# Patient Record
Sex: FEMALE | Race: WHITE | Employment: UNEMPLOYED | RURAL
[De-identification: names, ages, dates, MRNs, and addresses within clinical notes are randomized per-mention and may not be internally consistent; named-entity substitution may affect disease eponyms.]

---

## 2018-02-07 ENCOUNTER — TELEPHONE (OUTPATIENT)
Dept: FAMILY MEDICINE CLINIC | Age: 48
End: 2018-02-07

## 2018-02-07 NOTE — TELEPHONE ENCOUNTER
Patient got a tick bite and as of yesterday was swollen and red around the area. Swelling has gone down but still red. Wants to know if she should come in or wait another day since the swelling is going away. She can be reached at [phone].

## 2018-02-08 ENCOUNTER — OFFICE VISIT (OUTPATIENT)
Dept: FAMILY MEDICINE CLINIC | Age: 48
End: 2018-02-08

## 2018-02-08 VITALS
RESPIRATION RATE: 16 BRPM | SYSTOLIC BLOOD PRESSURE: 106 MMHG | DIASTOLIC BLOOD PRESSURE: 70 MMHG | WEIGHT: 160.4 LBS | TEMPERATURE: 96.9 F | HEART RATE: 88 BPM | HEIGHT: 63 IN | OXYGEN SATURATION: 98 % | BODY MASS INDEX: 28.42 KG/M2

## 2018-02-08 DIAGNOSIS — L03.221 CELLULITIS OF NECK: ICD-10-CM
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
## 2018-02-08 RX ORDER — HYDROCORTISONE 0.5 %
OINTMENT (GRAM) TOPICAL 2 TIMES DAILY
COMMUNITY

## 2018-02-08 RX ORDER — NORGESTIMATE AND ETHINYL ESTRADIOL 0.25-0.035
KIT ORAL
COMMUNITY
Start: 2018-02-05 | End: 2019-09-30

## 2018-02-08 RX ORDER — DOXYCYCLINE 100 MG/1
100 TABLET ORAL 2 TIMES DAILY
Qty: 20 TAB | Refills: 0 | Status: SHIPPED | OUTPATIENT
Start: 2018-02-08 | End: 2018-02-18

## 2018-02-08 RX ORDER — SULFAMETHOXAZOLE AND TRIMETHOPRIM 800; 160 MG/1; MG/1
120 TABLET ORAL 2 TIMES DAILY
COMMUNITY
Start: 2018-01-31 | End: 2019-09-30 | Stop reason: SDUPTHER

## 2019-11-14 NOTE — PROGRESS NOTES
HISTORY OF PRESENT ILLNESS  Nunu Tesfaye is a 57 y.o. female. Chief Complaint   Patient presents with    Rash     on neck,after insect bite       HPI  Pulled a tick off on left neck 4 d ago  Tried HC cream   Then rash started Tue, 3 d ago  Attached about 5 hours only    Review of Systems   Constitutional: Negative for fever. Musculoskeletal: Negative for myalgias. Neurological: Negative for headaches. History reviewed. No pertinent past medical history. Current Outpatient Prescriptions   Medication Sig Dispense Refill    ARMOUR THYROID 60 mg tablet       hydrocortisone (CORTIZONE) 0.5 % ointment Apply  to affected area two (2) times a day. use thin layer      doxycycline (ADOXA) 100 mg tablet Take 1 Tab by mouth two (2) times a day for 10 days. 20 Tab 0    cetirizine (ZYRTEC) 10 mg tablet Take 10 mg by mouth nightly.  SPRINTEC, 28, 0.25-35 mg-mcg tab       levothyroxine (SYNTHROID) 88 mcg tablet Take  by mouth Daily (before breakfast).  topiramate (TOPAMAX) 25 mg tablet Take  by mouth two (2) times a day. Allergies   Allergen Reactions    Benadryl [Diphenhydramine Hcl] Rash    Erythromycin Unknown (comments)     Visit Vitals    /70 (BP 1 Location: Right arm, BP Patient Position: Sitting)    Pulse 88    Temp 96.9 °F (36.1 °C) (Oral)    Resp 16    Ht 5' 3\" (1.6 m)    Wt 160 lb 6.4 oz (72.8 kg)    SpO2 98%    BMI 28.41 kg/m2     Physical Exam   Constitutional: She is oriented to person, place, and time. She appears well-developed and well-nourished. No distress. HENT:   Head: Normocephalic and atraumatic. Eyes: Conjunctivae and EOM are normal.   Pulmonary/Chest: Effort normal.   Neurological: She is alert and oriented to person, place, and time. Skin: Skin is warm and dry. There is erythema. Left neck with redness about 12 cm   Psychiatric: She has a normal mood and affect. Nursing note and vitals reviewed. ASSESSMENT and PLAN    ICD-10-CM ICD-9-CM    1.  Tick bite, initial encounter W57. XXXA 919.4 doxycycline (ADOXA) 100 mg tablet     E906.4    2.  Cellulitis of neck L03.221 682.1 doxycycline (ADOXA) 100 mg tablet   monitor outline of redness and RTC if enlarging PAST MEDICAL HISTORY:  Chronic anal fissure     HLD (hyperlipidemia)     HTN (hypertension) PAST MEDICAL HISTORY:  Chronic anal fissure     Constipation     Dry eyes, bilateral     History of hemorrhoids     HLD (hyperlipidemia)     HTN (hypertension)     Seasonal allergies

## 2023-04-27 RX ORDER — CETIRIZINE HYDROCHLORIDE 10 MG/1
10 TABLET ORAL NIGHTLY
COMMUNITY

## 2023-04-27 RX ORDER — LEVOTHYROXINE AND LIOTHYRONINE 76; 18 UG/1; UG/1
120 TABLET ORAL 2 TIMES DAILY
COMMUNITY
Start: 2019-09-17